# Patient Record
Sex: MALE | Race: WHITE | NOT HISPANIC OR LATINO | URBAN - NONMETROPOLITAN AREA
[De-identification: names, ages, dates, MRNs, and addresses within clinical notes are randomized per-mention and may not be internally consistent; named-entity substitution may affect disease eponyms.]

---

## 2021-07-01 ENCOUNTER — IMPORTED ENCOUNTER (OUTPATIENT)
Dept: URBAN - NONMETROPOLITAN AREA CLINIC 1 | Facility: CLINIC | Age: 26
End: 2021-07-01

## 2021-07-01 PROBLEM — H52.13: Noted: 2018-03-13

## 2021-07-01 PROBLEM — H52.223: Noted: 2021-07-01

## 2021-07-01 PROCEDURE — 92310 CONTACT LENS FITTING OU: CPT

## 2021-07-01 PROCEDURE — S0621 ROUTINE OPHTHALMOLOGICAL EXA: HCPCS

## 2021-07-01 NOTE — PATIENT DISCUSSION
Compound Myopic Astigmatism OU -  discussed findings w/patient-  new spectacle/CL Rx issued-  continue to monitor yearly or prn; 's Notes: MR 7/1/2021DFE 7/1/2021

## 2022-04-09 ASSESSMENT — VISUAL ACUITY
OD_SC: 20/25
OU_SC: 20/20
OS_SC: 20/20
OS_SC: 20/25
OD_SC: 20/20-

## 2022-04-09 ASSESSMENT — TONOMETRY
OD_IOP_MMHG: 16
OS_IOP_MMHG: 14

## 2022-07-27 ENCOUNTER — NEW PATIENT (OUTPATIENT)
Dept: RURAL CLINIC 1 | Facility: CLINIC | Age: 27
End: 2022-07-27

## 2022-07-27 DIAGNOSIS — H52.13: ICD-10-CM

## 2022-07-27 DIAGNOSIS — H52.223: ICD-10-CM

## 2022-07-27 DIAGNOSIS — E10.9: ICD-10-CM

## 2022-07-27 PROCEDURE — 92310-E CONTACT LENS FITTING ESTABLISH PATIENT

## 2022-07-27 PROCEDURE — S0620 ROUTINE OPHTHALMOLOGICAL EXA: HCPCS

## 2022-07-27 ASSESSMENT — VISUAL ACUITY
OD_CC: 20/20
OD_CC: 20/20
OS_CC: 20/20-1
OS_CC: 20/20

## 2022-07-27 ASSESSMENT — TONOMETRY
OD_IOP_MMHG: 19
OS_IOP_MMHG: 19

## 2022-07-27 NOTE — PATIENT DISCUSSION
Compound Myopic Astigmatism OU -  discussed findings w/patient-  new spectacle/CL Rx issued-  continue to monitor yearly or prn; 's Notes: MR 7/1/2021DFE 7/1/2021.

## 2025-04-28 ENCOUNTER — HOSPITAL ENCOUNTER (OUTPATIENT)
Facility: HOSPITAL | Age: 30
Setting detail: SPECIMEN
Discharge: HOME | End: 2025-05-01
Payer: OTHER GOVERNMENT

## 2025-04-28 ENCOUNTER — OFFICE VISIT (OUTPATIENT)
Age: 30
End: 2025-04-28
Payer: OTHER GOVERNMENT

## 2025-04-28 VITALS
HEIGHT: 69 IN | BODY MASS INDEX: 23.25 KG/M2 | TEMPERATURE: 98 F | OXYGEN SATURATION: 100 % | DIASTOLIC BLOOD PRESSURE: 102 MMHG | HEART RATE: 78 BPM | WEIGHT: 157 LBS | SYSTOLIC BLOOD PRESSURE: 144 MMHG

## 2025-04-28 DIAGNOSIS — E27.8 ADRENAL MASS: Primary | ICD-10-CM

## 2025-04-28 DIAGNOSIS — R16.0 LIVER MASS: ICD-10-CM

## 2025-04-28 DIAGNOSIS — K76.89 FOCAL NODULAR HYPERPLASIA OF LIVER: ICD-10-CM

## 2025-04-28 LAB
ALBUMIN SERPL-MCNC: 4.4 G/DL (ref 3.4–5)
ALBUMIN/GLOB SERPL: 1 (ref 0.8–1.7)
ALP SERPL-CCNC: 147 U/L (ref 45–117)
ALT SERPL-CCNC: 34 U/L (ref 16–61)
ANION GAP SERPL CALC-SCNC: 5 MMOL/L (ref 3–18)
AST SERPL-CCNC: 17 U/L (ref 10–38)
BASOPHILS # BLD: 0.07 K/UL (ref 0–0.1)
BASOPHILS NFR BLD: 0.8 % (ref 0–2)
BILIRUB SERPL-MCNC: 0.7 MG/DL (ref 0.2–1)
BUN SERPL-MCNC: 11 MG/DL (ref 7–18)
BUN/CREAT SERPL: 13 (ref 12–20)
CALCIUM SERPL-MCNC: 9.5 MG/DL (ref 8.5–10.1)
CEA SERPL-MCNC: 6.5 NG/ML
CHLORIDE SERPL-SCNC: 101 MMOL/L (ref 100–111)
CO2 SERPL-SCNC: 32 MMOL/L (ref 21–32)
CREAT SERPL-MCNC: 0.88 MG/DL (ref 0.6–1.3)
DIFFERENTIAL METHOD BLD: ABNORMAL
EOSINOPHIL # BLD: 0.02 K/UL (ref 0–0.4)
EOSINOPHIL NFR BLD: 0.2 % (ref 0–5)
ERYTHROCYTE [DISTWIDTH] IN BLOOD BY AUTOMATED COUNT: 11.9 % (ref 11.6–14.5)
GLOBULIN SER CALC-MCNC: 4.3 G/DL (ref 2–4)
GLUCOSE SERPL-MCNC: 93 MG/DL (ref 74–99)
HCT VFR BLD AUTO: 49.8 % (ref 36–48)
HGB BLD-MCNC: 16.9 G/DL (ref 13–16)
IMM GRANULOCYTES # BLD AUTO: 0.04 K/UL (ref 0–0.04)
IMM GRANULOCYTES NFR BLD AUTO: 0.5 % (ref 0–0.5)
INR PPP: 1 (ref 0.9–1.1)
LYMPHOCYTES # BLD: 2.09 K/UL (ref 0.9–3.3)
LYMPHOCYTES NFR BLD: 24 % (ref 21–52)
MCH RBC QN AUTO: 31.6 PG (ref 24–34)
MCHC RBC AUTO-ENTMCNC: 33.9 G/DL (ref 31–37)
MCV RBC AUTO: 93.3 FL (ref 78–100)
MONOCYTES # BLD: 0.7 K/UL (ref 0.05–1.2)
MONOCYTES NFR BLD: 8 % (ref 3–10)
NEUTS SEG # BLD: 5.78 K/UL (ref 1.8–8)
NEUTS SEG NFR BLD: 66.5 % (ref 40–73)
NRBC # BLD: 0 K/UL (ref 0–0.01)
NRBC BLD-RTO: 0 PER 100 WBC
PLATELET # BLD AUTO: 621 K/UL (ref 135–420)
PMV BLD AUTO: 9 FL (ref 9.2–11.8)
POTASSIUM SERPL-SCNC: 4.3 MMOL/L (ref 3.5–5.5)
PROT SERPL-MCNC: 8.7 G/DL (ref 6.4–8.2)
PROTHROMBIN TIME: 13.1 SEC (ref 11.9–14.9)
RBC # BLD AUTO: 5.34 M/UL (ref 4.35–5.65)
SODIUM SERPL-SCNC: 138 MMOL/L (ref 136–145)
WBC # BLD AUTO: 8.7 K/UL (ref 4.6–13.2)

## 2025-04-28 PROCEDURE — 82378 CARCINOEMBRYONIC ANTIGEN: CPT

## 2025-04-28 PROCEDURE — 86704 HEP B CORE ANTIBODY TOTAL: CPT

## 2025-04-28 PROCEDURE — 36415 COLL VENOUS BLD VENIPUNCTURE: CPT

## 2025-04-28 PROCEDURE — 80053 COMPREHEN METABOLIC PANEL: CPT

## 2025-04-28 PROCEDURE — 99204 OFFICE O/P NEW MOD 45 MIN: CPT | Performed by: INTERNAL MEDICINE

## 2025-04-28 PROCEDURE — 85025 COMPLETE CBC W/AUTO DIFF WBC: CPT

## 2025-04-28 PROCEDURE — 82103 ALPHA-1-ANTITRYPSIN TOTAL: CPT

## 2025-04-28 PROCEDURE — 86706 HEP B SURFACE ANTIBODY: CPT

## 2025-04-28 PROCEDURE — 82107 ALPHA-FETOPROTEIN L3: CPT

## 2025-04-28 PROCEDURE — 82390 ASSAY OF CERULOPLASMIN: CPT

## 2025-04-28 PROCEDURE — 82728 ASSAY OF FERRITIN: CPT

## 2025-04-28 PROCEDURE — G0480 DRUG TEST DEF 1-7 CLASSES: HCPCS

## 2025-04-28 PROCEDURE — 83540 ASSAY OF IRON: CPT

## 2025-04-28 PROCEDURE — 87340 HEPATITIS B SURFACE AG IA: CPT

## 2025-04-28 PROCEDURE — 83550 IRON BINDING TEST: CPT

## 2025-04-28 PROCEDURE — 86708 HEPATITIS A ANTIBODY: CPT

## 2025-04-28 PROCEDURE — 86803 HEPATITIS C AB TEST: CPT

## 2025-04-28 PROCEDURE — 85610 PROTHROMBIN TIME: CPT

## 2025-04-28 PROCEDURE — 86301 IMMUNOASSAY TUMOR CA 19-9: CPT

## 2025-04-28 RX ORDER — DEXAMETHASONE 1 MG
TABLET ORAL
COMMUNITY
Start: 2025-04-24 | End: 2025-05-05

## 2025-04-28 NOTE — PROGRESS NOTES
[facility]     Thomas Flores MD, FACP, MACG, FAASLD   MD Anjana Bae PA-C April S Ashworth, [facility]-BC   Shayla Azul, [facility]  Cameron Beau, FNP-C   Nannette Sage, [facility]-BC         [facility] [place]   [address], [address]   [place]  [address] [phone]   FAX: [phone]  [facility]   [address], [address]   [place]  [address] [phone]   FAX: [phone]       Patient Care Team:  Florin Hernández MD as PCP - General (Family Medicine)  Karissa Brand MD (Surgical Oncology)      Patient Active Problem List   Diagnosis    Adrenal mass    Focal nodular hyperplasia of liver       The clinicians listed above have asked me to see Tommie Renteria in consultation regarding a liver mass.      All medical records sent by the referring physicians were reviewed including imaging studies     The patient is a 29 y.o. male without any history of previous liver disease.      The patient has had serial CT scans to monitor a mass in the right adrenal gland as follows:  CT 3/2023.  Right Adrenal mass 2.5 cm,.  Small liver lesion  CT 10/2024.  Right adrenal mass 2.8 x 1.8 cm.  No liver mass  Dynamic MRI 12/2024.  Right adrenal mass 1.8 cm.  Liver mass segment 4 measures 4.2 x 4.3   CT 3/2025.  Diverticulitis vs colitis.  Liver mass 4.5 cm  Right adrenal; 2.8 cm    The patient had evaluation for the adrenal mass as follows:  Total Metanephrines and Normetanephrines were both elevated.  Corisol, ASCT, DHEA, aldosterone were all negative    AFP and L3% was ordered and results still pending.  CA 19-9 and CEA were normal    In the office today the patient has the following symptoms:  The patient feels well and has no complaints.    The patient is not experiencing the following

## 2025-04-29 LAB
A1AT SERPL-MCNC: 178 MG/DL (ref 95–164)
CANCER AG19-9 SERPL-ACNC: 15 U/ML (ref 0–35)
CERULOPLASMIN SERPL-MCNC: 33.1 MG/DL (ref 16–31)
FERRITIN SERPL-MCNC: 170 NG/ML (ref 8–388)
HAV AB SER QL IA: POSITIVE
HBV CORE AB SERPL QL IA: NEGATIVE
HBV SURFACE AG SER QL: NONREACTIVE
HCV AB SERPL QL IA: NORMAL
HCV IGG SERPL QL IA: NON REACTIVE S/CO RATIO
IRON SATN MFR SERPL: 28 % (ref 20–50)
IRON SERPL-MCNC: 99 UG/DL (ref 50–175)
TIBC SERPL-MCNC: 357 UG/DL (ref 250–450)

## 2025-05-01 LAB — HBV SURFACE AB SERPL IA-ACNC: 8198 MIU/ML

## 2025-05-02 ENCOUNTER — RESULTS FOLLOW-UP (OUTPATIENT)
Age: 30
End: 2025-05-02

## 2025-05-03 LAB
PETH BLD QL SCN: POSITIVE
PETH BLD-MCNC: 495 NG/ML

## 2025-05-05 ENCOUNTER — TELEPHONE (OUTPATIENT)
Age: 30
End: 2025-05-05

## 2025-05-05 DIAGNOSIS — K76.89 FOCAL NODULAR HYPERPLASIA OF LIVER: Primary | ICD-10-CM

## 2025-05-05 DIAGNOSIS — E27.8 ADRENAL MASS: ICD-10-CM

## 2025-05-05 NOTE — TELEPHONE ENCOUNTER
[facility] called stating they received a referral for the pt but due to them having [facility] as their [facility] they need the provider to [facility]fl to [facility] for him to be seen by them. SDF

## 2025-05-05 NOTE — TELEPHONE ENCOUNTER
Pt called wanting to speak with the provider regarding his recent lab results, as well as needing his MRI sent to [facility].     MRI has been sent to [facility] on 05/05/25. SDF

## 2025-05-05 NOTE — TELEPHONE ENCOUNTER
Called patient to inform him Dr. Flores will call him today regarding his recent lab results. Informed patient Dr. Flores wanted his MRI and labs reviewed by a surgical oncologist in [place] and I had sent a referral to them. They will call him in next several days to schedule and appointment. Reviewed his Hepatitis labs with him upon request, nothing active. Patient verbally confirmed understanding and will wait for Dr. Flores's call.

## 2025-05-06 ENCOUNTER — TELEPHONE (OUTPATIENT)
Age: 30
End: 2025-05-06

## 2025-05-06 NOTE — TELEPHONE ENCOUNTER
Request for Services emailed to Cara Segura RN, [facility] today for [facility] services for an adrenal mass work up.

## 2025-05-13 LAB
AFP L3 MFR SERPL: NORMAL % (ref 0–9.9)
AFP SERPL-MCNC: 1 NG/ML (ref 0–5.7)

## 2025-05-20 ENCOUNTER — TELEPHONE (OUTPATIENT)
Age: 30
End: 2025-05-20

## 2025-05-20 NOTE — TELEPHONE ENCOUNTER
Called [facility], patient has first appointment with Dr. Ingram on 6/10/25 regarding adrenal mass.